# Patient Record
Sex: MALE | Race: WHITE
[De-identification: names, ages, dates, MRNs, and addresses within clinical notes are randomized per-mention and may not be internally consistent; named-entity substitution may affect disease eponyms.]

---

## 2017-11-17 ENCOUNTER — HOSPITAL ENCOUNTER (EMERGENCY)
Dept: HOSPITAL 9 - MADERS | Age: 62
Discharge: HOME | End: 2017-11-17
Payer: SELF-PAY

## 2017-11-17 DIAGNOSIS — N20.2: Primary | ICD-10-CM

## 2017-11-17 DIAGNOSIS — F17.200: ICD-10-CM

## 2017-11-17 DIAGNOSIS — E11.9: ICD-10-CM

## 2017-11-17 LAB
BACTERIA UR QL AUTO: (no result) HPF
RBC UR QL AUTO: (no result) HPF (ref 0–3)
SP GR UR STRIP: 1.02 (ref 1–1.03)
WBC UR QL AUTO: (no result) HPF (ref 0–3)

## 2017-11-17 PROCEDURE — 81015 MICROSCOPIC EXAM OF URINE: CPT

## 2017-11-17 PROCEDURE — 96372 THER/PROPH/DIAG INJ SC/IM: CPT

## 2017-11-17 PROCEDURE — 74176 CT ABD & PELVIS W/O CONTRAST: CPT

## 2017-11-17 PROCEDURE — 81003 URINALYSIS AUTO W/O SCOPE: CPT

## 2017-11-17 NOTE — CT
CT ABDOMEN AND PELVIS NONCONTRAST:

 

Date:  11/17/17 

 

HISTORY:  

Left flank pain, dysuria. 

 

FINDINGS:

 

No comparison. 

 

The left renal collecting system and ureter are moderately distended to the level of a 0.8 cm calculu
s within the mid left ureter at the L4-5 level. The left ureter beyond this point is decompressed. 

 

Multiple stones are present within calices of each kidney, measuring up to 0.6 cm on the left and 0.5
 cm on the right. Urinary bladder is incompletely distended.

 

Lack of contrast limits evaluation for other abnormalities. The liver is diffusely hypodense. There i
s calcification in the arterial structures. Diverticula arise from the colon. 

 

IMPRESSION: 

1.  Partial obstruction at an 8 mm mid left ureteral calculus. Additional nonobstructing bilateral re
nal calculi. 

 

2.  Atherosclerosis. 

 

3.  Diverticulosis. 

 

4.  Hepatic steatosis. 

 

 

POS: [facility]

## 2017-11-28 ENCOUNTER — HOSPITAL ENCOUNTER (EMERGENCY)
Dept: HOSPITAL 92 - ERS | Age: 62
Discharge: HOME | End: 2017-11-28
Payer: SELF-PAY

## 2017-11-28 DIAGNOSIS — E11.9: ICD-10-CM

## 2017-11-28 DIAGNOSIS — F17.210: ICD-10-CM

## 2017-11-28 DIAGNOSIS — N13.2: Primary | ICD-10-CM

## 2017-11-28 DIAGNOSIS — I10: ICD-10-CM

## 2017-11-28 LAB
ANION GAP SERPL CALC-SCNC: 13 MMOL/L (ref 10–20)
BASOPHILS # BLD AUTO: 0 THOU/UL (ref 0–0.2)
BASOPHILS NFR BLD AUTO: 0.2 % (ref 0–1)
BUN SERPL-MCNC: 21 MG/DL (ref 8.4–25.7)
CALCIUM SERPL-MCNC: 9.9 MG/DL (ref 7.8–10.44)
CHLORIDE SERPL-SCNC: 98 MMOL/L (ref 98–107)
CO2 SERPL-SCNC: 25 MMOL/L (ref 23–31)
CREAT CL PREDICTED SERPL C-G-VRATE: 0 ML/MIN (ref 70–130)
EOSINOPHIL # BLD AUTO: 0.3 THOU/UL (ref 0–0.7)
EOSINOPHIL NFR BLD AUTO: 3.4 % (ref 0–10)
HCT VFR BLD CALC: 45.4 % (ref 42–52)
LYMPHOCYTES # BLD: 2.1 THOU/UL (ref 1.2–3.4)
LYMPHOCYTES NFR BLD AUTO: 23.5 % (ref 21–51)
MONOCYTES # BLD AUTO: 0.8 THOU/UL (ref 0.11–0.59)
MONOCYTES NFR BLD AUTO: 8.8 % (ref 0–10)
NEUTROPHILS # BLD AUTO: 5.8 THOU/UL (ref 1.4–6.5)
RBC # BLD AUTO: 4.78 MILL/UL (ref 4.7–6.1)
WBC # BLD AUTO: 9 THOU/UL (ref 4.8–10.8)

## 2017-11-28 PROCEDURE — 81003 URINALYSIS AUTO W/O SCOPE: CPT

## 2017-11-28 PROCEDURE — 80048 BASIC METABOLIC PNL TOTAL CA: CPT

## 2017-11-28 PROCEDURE — 99406 BEHAV CHNG SMOKING 3-10 MIN: CPT

## 2017-11-28 PROCEDURE — 85025 COMPLETE CBC W/AUTO DIFF WBC: CPT

## 2019-03-21 ENCOUNTER — HOSPITAL ENCOUNTER (INPATIENT)
Dept: HOSPITAL 92 - ERS | Age: 64
LOS: 2 days | Discharge: HOME | DRG: 69 | End: 2019-03-23
Attending: FAMILY MEDICINE | Admitting: FAMILY MEDICINE
Payer: MEDICARE

## 2019-03-21 ENCOUNTER — HOSPITAL ENCOUNTER (EMERGENCY)
Dept: HOSPITAL 9 - MADERS | Age: 64
Discharge: ANOTHER HOSPITAL | End: 2019-03-21
Payer: MEDICARE

## 2019-03-21 VITALS — BODY MASS INDEX: 36.6 KG/M2

## 2019-03-21 DIAGNOSIS — I10: ICD-10-CM

## 2019-03-21 DIAGNOSIS — F17.210: ICD-10-CM

## 2019-03-21 DIAGNOSIS — E11.9: ICD-10-CM

## 2019-03-21 DIAGNOSIS — G51.0: ICD-10-CM

## 2019-03-21 DIAGNOSIS — F17.200: ICD-10-CM

## 2019-03-21 DIAGNOSIS — G45.9: Primary | ICD-10-CM

## 2019-03-21 DIAGNOSIS — I63.9: Primary | ICD-10-CM

## 2019-03-21 DIAGNOSIS — Z79.899: ICD-10-CM

## 2019-03-21 DIAGNOSIS — Z87.442: ICD-10-CM

## 2019-03-21 DIAGNOSIS — I25.2: ICD-10-CM

## 2019-03-21 LAB
ALBUMIN SERPL BCG-MCNC: 4.3 G/DL (ref 3.4–4.8)
ALP SERPL-CCNC: 64 U/L (ref 40–150)
ALT SERPL W P-5'-P-CCNC: 32 U/L (ref 8–55)
ANION GAP SERPL CALC-SCNC: 11 MMOL/L (ref 10–20)
APTT PPP: 35.1 SEC (ref 22.9–36.1)
AST SERPL-CCNC: 22 U/L (ref 5–34)
BASOPHILS # BLD AUTO: 0.1 THOU/UL (ref 0–0.2)
BASOPHILS NFR BLD AUTO: 0.9 % (ref 0–1)
BILIRUB SERPL-MCNC: 0.4 MG/DL (ref 0.2–1.2)
BUN SERPL-MCNC: 13 MG/DL (ref 8.4–25.7)
CALCIUM SERPL-MCNC: 10.1 MG/DL (ref 7.8–10.44)
CHLORIDE SERPL-SCNC: 99 MMOL/L (ref 98–107)
CK SERPL-CCNC: 241 U/L (ref 30–200)
CO2 SERPL-SCNC: 31 MMOL/L (ref 23–31)
CREAT CL PREDICTED SERPL C-G-VRATE: 0 ML/MIN (ref 70–130)
EOSINOPHIL # BLD AUTO: 0.4 THOU/UL (ref 0–0.7)
EOSINOPHIL NFR BLD AUTO: 3.7 % (ref 0–10)
GLOBULIN SER CALC-MCNC: 3.7 G/DL (ref 2.4–3.5)
GLUCOSE SERPL-MCNC: 152 MG/DL (ref 80–115)
HGB BLD-MCNC: 14.2 G/DL (ref 14–18)
INR PPP: 1
LYMPHOCYTES # BLD AUTO: 2.6 THOU/UL (ref 1.2–3.4)
LYMPHOCYTES NFR BLD AUTO: 25.5 % (ref 21–51)
MCH RBC QN AUTO: 29.6 PG (ref 27–31)
MCV RBC AUTO: 92 FL (ref 78–98)
MONOCYTES # BLD AUTO: 0.8 THOU/UL (ref 0.11–0.59)
MONOCYTES NFR BLD AUTO: 7.8 % (ref 0–10)
NEUTROPHILS # BLD AUTO: 6.3 THOU/UL (ref 1.4–6.5)
NEUTROPHILS NFR BLD AUTO: 62.1 % (ref 42–75)
PLATELET # BLD AUTO: 223 THOU/UL (ref 130–400)
POTASSIUM SERPL-SCNC: 4.4 MMOL/L (ref 3.5–5.1)
PROTHROMBIN TIME: 13.1 SEC (ref 12–14.7)
RBC # BLD AUTO: 4.8 MILL/UL (ref 4.7–6.1)
SODIUM SERPL-SCNC: 137 MMOL/L (ref 136–145)
WBC # BLD AUTO: 10.1 THOU/UL (ref 4.8–10.8)

## 2019-03-21 PROCEDURE — 70450 CT HEAD/BRAIN W/O DYE: CPT

## 2019-03-21 PROCEDURE — 84484 ASSAY OF TROPONIN QUANT: CPT

## 2019-03-21 PROCEDURE — 80053 COMPREHEN METABOLIC PANEL: CPT

## 2019-03-21 PROCEDURE — 85610 PROTHROMBIN TIME: CPT

## 2019-03-21 PROCEDURE — 93005 ELECTROCARDIOGRAM TRACING: CPT

## 2019-03-21 PROCEDURE — 36416 COLLJ CAPILLARY BLOOD SPEC: CPT

## 2019-03-21 PROCEDURE — 80061 LIPID PANEL: CPT

## 2019-03-21 PROCEDURE — 36415 COLL VENOUS BLD VENIPUNCTURE: CPT

## 2019-03-21 PROCEDURE — 82550 ASSAY OF CK (CPK): CPT

## 2019-03-21 PROCEDURE — 85025 COMPLETE CBC W/AUTO DIFF WBC: CPT

## 2019-03-21 PROCEDURE — 93880 EXTRACRANIAL BILAT STUDY: CPT

## 2019-03-21 PROCEDURE — 85730 THROMBOPLASTIN TIME PARTIAL: CPT

## 2019-03-21 NOTE — CT
CT HEAD NONCONTRAST:

 

HISTORY:

Altered mental status.  Prior surgery.  Stroke protocol.

 

FINDINGS:

There is no evidence of acute intracranial hemorrhage or infarct.  Right parietal occipital craniotom
y changes are present with overlying metallic fixation plates and underlying metallic clips and coils
.  An oval area of CSF density involving the lateral margin of the left temporal lobe may represent a
n old area of encephalomalacia from prior infarct or result of prior surgery.  There is no mass effec
t or shift of midline structures.  The visualized paranasal sinuses remain well aerated.  Fluid layer
s within the dependent portion of the right maxillary sinus.  Mild mucosal thickening is in the ethmo
id air cells.

 

IMPRESSION:

1.  Old postoperative and possible old ischemic changes.  No acute intracranial abnormalities are dem
onstrated.

 

2.  Right maxillary sinusitis.

 

POS: [facility]

## 2019-03-22 NOTE — HP
HISTORY OF PRESENT ILLNESS:  The patient is a 63-year-old male who is new to me. I

have seen his wife as a patient. He had been willing to transfer his care to me.

Apparently, he was seen in [place] emergency room with mental status change, he

was brought there. Family reports he had right-sided weakness and aphasia which

improved. The patient still had trouble finding words. He was brought to [facility] due to  __________ photophobia, flashes of light. The patient was found to

be hypertensive. He reported a recent illness of URI and allergy that had been

treated there as well as a history of heat stroke that had been previously treated.

The patient is alert and oriented to time, place, by the time he got to [facility] and continued to exhibit areas of word confusion, not being able to find

the word he wanted to say, but still remained alert, oriented x3. He did not note

any head trauma. He apparently had a CT scan done there, which was normal. 



Otherwise, no other medical complaints.



ALLERGIES:  NO KNOWN ALLERGIES.



CURRENT MEDICATIONS:  

1. Glyburide 6 mg daily.

2. Pioglitazone 15 mg daily.

3. Gabapentin 100 mg daily.

4. Enalapril 10 mg daily.



PAST MEDICAL HISTORY:  Positive for type 2 diabetes mellitus. He has had history of

a Bell's palsy, apparently myocardial infarction, kidney stones, hypertension. He

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 



PAST SURGICAL HISTORY:  Positive for back surgery, multiple orthopedic surgeries,

kidney stone removal. 



SOCIAL AND PERSONAL HISTORY:  He is . He does smoke. He does not drink

alcohol. 



FAMILY HISTORY:  Not obtainable at this time.



PHYSICAL EXAMINATION:

VITAL SIGNS: His a.m. vital signs, temperature 98.1, pulse 74, /90,

respirations 18, O2 sats 95%. 

GENERAL: He is alert and active, in no distress, he recognizes me. He is oriented to

person, place, and time. He does seem to have some difficulty recalling words, has

significant long pauses when searching for words. Otherwise, no other medical issues

noted. 

HEENT: Otherwise unremarkable. 

NECK: Supple. Full range of motion. No masses. 

LUNGS: Clear. 

HEART: Reveals a regular rate and rhythm. No murmurs, gallops, or rubs. ABDOMEN:

Soft and nontender. Bowel sounds are present and active. There is no

hepatosplenomegaly noted. 



LABORATORY DATA:  His glucose is 191. Troponin less than 0.1. I do not have access

to his [place] records at this time, but apparently they were normal. 



IMPRESSION:  This is a 63-year-old male who is now placed in observation for rule

out transient ischemic attack and stroke protocol. 



PLAN:  He apparently has had a CT scan done. We will go and proceed with MRI scan of

the brain today, keep him on anti-stroke precautions. 







Job ID:  [id number]

## 2019-03-22 NOTE — ULT
CAROTID ULTRASOUND WITH GRAY SCALE AND DOPPLER DUPLEX COLOR FLOW IMAGING

SPECTRAL ANALYSIS PERFORMED:

3/22/19

 

CLINICAL INDICATION: 

TIA/stroke, expressive aphasia. 

 

FINDINGS: 

There is scattered mild atherosclerotic calcification of the carotid arteries.

 

PEAK SYSTOLIC VELOCITY (CM/S):

Right CCA         92

Left CCA          92

Right ICA         74

Left ICA          90

 

There is antegrade flow within the visualized left vertebral arteries.

The right vertebral artery is not reliably evaluated. 

 

IMPRESSION: 

1.  No hemodynamically significant stenosis of the right internal carotid artery.

2.  No hemodynamically significant stenosis of the left internal carotid artery.

3.  Indeterminate flow of the suboptimally visualized right vertebral artery. Recommend clinical toby
elation to exclude evidence of vertebrobasilar insufficiency. As necessary brain MRI may be obtained 
if necessary. 

 

POS: [facility]

## 2019-03-22 NOTE — CT
CT BRAIN WITHOUT CONTRAST:

 

HISTORY:

TIA.  Expressive aphasia.

 

COMPARISON:

Exam from the previous day.

 

FINDINGS:

Postop changes in the right parietal occipital region are again seen.  No evidence of acute infarct, 
hemorrhage, midline shift, or abnormal extraaxial fluid collections is seen.  The ventricular size is
 appropriate, and the basilar cisterns are patent.  No acute calvarial abnormalities are identified. 
 There is mucosal disease in the paranasal sinuses.

 

IMPRESSION:

Stable examination.  No CT evidence of acute intracranial process.

 

POS: [facility]

## 2019-03-23 VITALS — DIASTOLIC BLOOD PRESSURE: 77 MMHG | TEMPERATURE: 97.8 F | SYSTOLIC BLOOD PRESSURE: 146 MMHG

## 2019-03-23 LAB
CHD RISK SERPL-RTO: 4.5 (ref ?–4.5)
CHOLEST SERPL-MCNC: 176 MG/DL
HDLC SERPL-MCNC: 39 MG/DL
LDLC SERPL CALC-MCNC: 97 MG/DL
TRIGL SERPL-MCNC: 202 MG/DL (ref ?–150)

## 2019-03-23 NOTE — DIS
DATE OF ADMISSION:  03/22/2019



DATE OF DISCHARGE:  03/23/2019



PRIMARY CARE PHYSICIAN:  Sarah Ng MD.  However, the patient is attempting

to transition to Dr. Easton Gomes, his spouse sees. 



CHIEF COMPLAINT:  Altered mental status.



HISTORY OF PRESENT ILLNESS:  The patient is having acute onset of difficulty of word

finding suspected CVA, presented to the emergency department with [facility] and was

transferred to [facility] for higher level of care with neurology consultation.

The patient has a history of diabetes type 2, hypertension, and history of remote AV

malformation which was repaired by his current neurologist in [facility].  No

reported deficits from prior AV malformation repair and destruction.  The patient

without any hemorrhagic component on CT of head x2.  The patient refused to go into

MRI, refused sedation protocol for MRI.  The patient states he wants to go through

an open air MRI and his chief concern is finances.  He states that he does not want

to wait for official read on carotid Doppler, although that is back prior to

discharge.  Does not want to wait for completion of echo and does not want to wait

for neurologists as every day he is here, he is being charged money.  He is

interested in starting a statin medication to help in addition to aspirin and his

home enalapril for secondary prevention.  The patient does want to complete

neurology consultation, MRI, echo on an outpatient basis.  Verbalized understanding

regarding incomplete workup this hospitalization and potential health risks if we

are missing part of the puzzle such as recurrence or extension of prior AV

malformation site. 



DISCHARGE MEDICATIONS:  Include:

1. Enalapril 10 mg.

2. Gabapentin 100 mg at bedtime.

3. Glyburide 6 mg p.o. b.i.d.

4. Actos 15 mg one tab p.o. daily.

5. Full-dose aspirin 325 mg one tab daily.

6. Rosuvastatin 20 mg one tab p.o. at bedtime.



FOLLOWUP:  The patient should follow up with PCP within 1 week or establish with Dr. Easton Gomes as proposed for ordering outpatient testing.  Instructed patient about

wait times for local neurologist, likely will be greater than his personal

neurologist in [place] and to follow up accordingly.  He verbalized understanding,

was ambulatory prior to discharge.  Having no difficulty with swallowing.  Using

restroom well.  No acute complaints other than word-finding issues, which are

continued. 



DISCHARGE DISPOSITION:  The patient discharged home in fair condition.



DISCHARGE DIET:  Heart healthy and ADA consistent carb.







Job ID:  [id number]

## 2019-11-20 ENCOUNTER — HOSPITAL ENCOUNTER (OUTPATIENT)
Dept: HOSPITAL 9 - MADCT | Age: 64
Discharge: HOME | End: 2019-11-20
Payer: MEDICARE

## 2019-11-20 DIAGNOSIS — N20.0: Primary | ICD-10-CM

## 2019-11-20 DIAGNOSIS — K57.30: ICD-10-CM

## 2019-11-20 PROCEDURE — 74176 CT ABD & PELVIS W/O CONTRAST: CPT

## 2019-11-20 NOTE — CT
CT ABDOMEN AND PELVIS WITHOUT CONTRAST:

 

HISTORY: 

Renal stones.  No cholelithiasis.

 

COMPARISON: 

11/17/2017.

 

FINDINGS: 

Absence of oral and IV contrast reduces the sensitivity of the exam, particularly for evaluation of s
olid organs involved.  Calcified granulomas noted at the right lung base.  There are calcified granul
omas in the liver and spleen.  There is fatty infiltration of the liver.  No calcified gallstones are
 noted.  No free air or free fluid is seen in the abdomen or pelvis.

 

There are bilateral renal calculi.  No calculi are seen in the ureters or the urinary bladder.  No hy
droureteral nephrosis on either side.  

 

The small bowel loops are not abnormally dilated.  There is colonic diverticulosis.  A normal-appeari
ng appendix is present.

 

There are vascular calcifications without evidence of aneurysmal dilatation of the abdominal aorta.  
There are degenerative changes in the spine.

 

IMPRESSION: 

1.  Nonobstructing bilateral renal calculi.

 

2.  Colonic diverticulosis.

 

POS: [facility]

## 2021-04-22 ENCOUNTER — HOSPITAL ENCOUNTER (OUTPATIENT)
Dept: HOSPITAL 92 - RAD | Age: 66
End: 2021-04-22
Attending: NEUROLOGICAL SURGERY
Payer: MEDICARE

## 2021-04-22 VITALS — DIASTOLIC BLOOD PRESSURE: 74 MMHG | SYSTOLIC BLOOD PRESSURE: 154 MMHG | TEMPERATURE: 98.2 F

## 2021-04-22 VITALS — BODY MASS INDEX: 38.1 KG/M2

## 2021-04-22 DIAGNOSIS — I25.10: ICD-10-CM

## 2021-04-22 DIAGNOSIS — Z79.899: ICD-10-CM

## 2021-04-22 DIAGNOSIS — M51.16: Primary | ICD-10-CM

## 2021-04-22 DIAGNOSIS — E11.40: ICD-10-CM

## 2021-04-22 DIAGNOSIS — N20.0: ICD-10-CM

## 2021-04-22 DIAGNOSIS — M43.16: ICD-10-CM

## 2021-04-22 DIAGNOSIS — F17.210: ICD-10-CM

## 2021-04-22 DIAGNOSIS — M48.061: ICD-10-CM

## 2021-04-22 DIAGNOSIS — Z79.82: ICD-10-CM

## 2021-04-22 DIAGNOSIS — I10: ICD-10-CM

## 2021-04-22 DIAGNOSIS — I25.2: ICD-10-CM

## 2021-04-22 DIAGNOSIS — G47.33: ICD-10-CM

## 2021-04-22 DIAGNOSIS — M25.78: ICD-10-CM

## 2021-04-22 DIAGNOSIS — Z79.84: ICD-10-CM

## 2021-04-22 DIAGNOSIS — Z86.73: ICD-10-CM

## 2021-04-22 PROCEDURE — 77002 NEEDLE LOCALIZATION BY XRAY: CPT

## 2021-04-22 PROCEDURE — 72132 CT LUMBAR SPINE W/DYE: CPT

## 2021-04-22 PROCEDURE — B02B1ZZ COMPUTERIZED TOMOGRAPHY (CT SCAN) OF SPINAL CORD USING LOW OSMOLAR CONTRAST: ICD-10-PCS | Performed by: RADIOLOGY

## 2022-12-16 ENCOUNTER — HOSPITAL ENCOUNTER (OUTPATIENT)
Dept: HOSPITAL 9 - MADLAB | Age: 67
Discharge: HOME | End: 2022-12-16
Attending: FAMILY MEDICINE
Payer: MEDICARE

## 2022-12-16 DIAGNOSIS — E03.8: ICD-10-CM

## 2022-12-16 DIAGNOSIS — E55.9: ICD-10-CM

## 2022-12-16 DIAGNOSIS — I15.0: Primary | ICD-10-CM

## 2022-12-16 DIAGNOSIS — E11.8: ICD-10-CM

## 2022-12-16 DIAGNOSIS — E78.2: ICD-10-CM

## 2022-12-16 DIAGNOSIS — R05.1: ICD-10-CM

## 2022-12-16 LAB
25(OH)D3+25(OH)D2 SERPL-MCNC: 39.6 NG/ML (ref 30–?)
ALBUMIN SERPL BCG-MCNC: 4.1 G/DL (ref 3.4–4.8)
ALP SERPL-CCNC: 73 U/L (ref 40–110)
ALT SERPL W P-5'-P-CCNC: 12 U/L (ref 8–55)
ANION GAP SERPL CALC-SCNC: 11 MMOL/L (ref 10–20)
AST SERPL-CCNC: 15 U/L (ref 5–34)
BASOPHILS # BLD AUTO: 0.1 THOU/UL (ref 0–0.2)
BASOPHILS NFR BLD AUTO: 1.3 % (ref 0–1)
BILIRUB SERPL-MCNC: 0.2 MG/DL (ref 0.2–1.2)
BUN SERPL-MCNC: 18 MG/DL (ref 8.4–25.7)
CALCIUM SERPL-MCNC: 9.5 MG/DL (ref 7.8–10.44)
CHD RISK SERPL-RTO: 3.9 (ref ?–4.5)
CHLORIDE SERPL-SCNC: 103 MMOL/L (ref 98–107)
CHOLEST SERPL-MCNC: 154 MG/DL
CO2 SERPL-SCNC: 26 MMOL/L (ref 23–31)
CREAT CL PREDICTED SERPL C-G-VRATE: 0 ML/MIN (ref 70–130)
EOSINOPHIL # BLD AUTO: 0.3 THOU/UL (ref 0–0.7)
EOSINOPHIL NFR BLD AUTO: 3.4 % (ref 0–10)
GLOBULIN SER CALC-MCNC: 3.8 G/DL (ref 2.4–3.5)
GLUCOSE SERPL-MCNC: 132 MG/DL (ref 80–115)
HDLC SERPL-MCNC: 40 MG/DL
HGB BLD-MCNC: 15.7 G/DL (ref 14–18)
LDLC SERPL CALC-MCNC: 88 MG/DL
LYMPHOCYTES # BLD AUTO: 1.9 THOU/UL (ref 1.2–3.4)
LYMPHOCYTES NFR BLD AUTO: 24 % (ref 21–51)
MCH RBC QN AUTO: 31.1 PG (ref 27–31)
MCV RBC AUTO: 93.5 FL (ref 78–98)
MONOCYTES # BLD AUTO: 0.7 THOU/UL (ref 0.11–0.59)
MONOCYTES NFR BLD AUTO: 8.4 % (ref 0–10)
NEUTROPHILS # BLD AUTO: 4.9 THOU/UL (ref 1.4–6.5)
NEUTROPHILS NFR BLD AUTO: 62.9 % (ref 42–75)
PLATELET # BLD AUTO: 233 10X3/UL (ref 130–400)
POTASSIUM SERPL-SCNC: 4.4 MMOL/L (ref 3.5–5.1)
RBC # BLD AUTO: 5.04 MILL/UL (ref 4.7–6.1)
SODIUM SERPL-SCNC: 136 MMOL/L (ref 136–145)
T4 FREE SERPL-MCNC: 0.97 NG/DL (ref 0.7–1.48)
TRIGL SERPL-MCNC: 130 MG/DL (ref ?–150)
WBC # BLD AUTO: 7.8 10X3/UL (ref 4.8–10.8)

## 2022-12-16 PROCEDURE — 86850 RBC ANTIBODY SCREEN: CPT

## 2022-12-16 PROCEDURE — 84439 ASSAY OF FREE THYROXINE: CPT

## 2022-12-16 PROCEDURE — 71046 X-RAY EXAM CHEST 2 VIEWS: CPT

## 2022-12-16 PROCEDURE — 84443 ASSAY THYROID STIM HORMONE: CPT

## 2022-12-16 PROCEDURE — 86900 BLOOD TYPING SEROLOGIC ABO: CPT

## 2022-12-16 PROCEDURE — 36415 COLL VENOUS BLD VENIPUNCTURE: CPT

## 2022-12-16 PROCEDURE — 83036 HEMOGLOBIN GLYCOSYLATED A1C: CPT

## 2022-12-16 PROCEDURE — 85025 COMPLETE CBC W/AUTO DIFF WBC: CPT

## 2022-12-16 PROCEDURE — 80061 LIPID PANEL: CPT

## 2022-12-16 PROCEDURE — 82306 VITAMIN D 25 HYDROXY: CPT

## 2022-12-16 PROCEDURE — 80053 COMPREHEN METABOLIC PANEL: CPT

## 2022-12-16 PROCEDURE — 86901 BLOOD TYPING SEROLOGIC RH(D): CPT
